# Patient Record
Sex: FEMALE | Race: WHITE | NOT HISPANIC OR LATINO | URBAN - METROPOLITAN AREA
[De-identification: names, ages, dates, MRNs, and addresses within clinical notes are randomized per-mention and may not be internally consistent; named-entity substitution may affect disease eponyms.]

---

## 2023-07-05 PROBLEM — N63.0 BREAST LUMP IN FEMALE: Status: ACTIVE | Noted: 2023-07-05

## 2023-07-05 PROBLEM — G47.33 OSA ON CPAP: Status: ACTIVE | Noted: 2023-07-05

## 2023-07-05 PROBLEM — N80.9 ENDOMETRIOSIS: Status: ACTIVE | Noted: 2023-07-05

## 2023-07-05 PROBLEM — N60.19 FIBROCYSTIC BREAST: Status: ACTIVE | Noted: 2023-07-05

## 2023-07-05 PROBLEM — R06.83 SNORING: Status: ACTIVE | Noted: 2023-07-05

## 2023-07-05 PROBLEM — N92.6 MISSED MENSES: Status: ACTIVE | Noted: 2023-07-05

## 2023-07-05 PROBLEM — R53.83 FATIGUE: Status: ACTIVE | Noted: 2023-07-05

## 2023-07-05 PROBLEM — G47.19 DAYTIME HYPERSOMNOLENCE: Status: ACTIVE | Noted: 2023-07-05

## 2023-07-05 PROBLEM — E66.01 MORBID OBESITY (MULTI): Status: ACTIVE | Noted: 2023-07-05

## 2023-07-05 PROBLEM — D64.9 ANEMIA: Status: ACTIVE | Noted: 2023-07-05

## 2023-07-05 PROBLEM — I10 ESSENTIAL HYPERTENSION: Status: ACTIVE | Noted: 2023-07-05

## 2023-07-05 PROBLEM — L83 ACANTHOSIS NIGRICANS: Status: ACTIVE | Noted: 2023-07-05

## 2023-07-05 PROBLEM — R35.0 FREQUENT URINATION: Status: ACTIVE | Noted: 2023-07-05

## 2023-07-05 RX ORDER — LOSARTAN POTASSIUM 100 MG/1
1 TABLET ORAL DAILY
COMMUNITY
Start: 2022-09-30 | End: 2023-07-06 | Stop reason: SDUPTHER

## 2023-07-06 ENCOUNTER — OFFICE VISIT (OUTPATIENT)
Dept: PRIMARY CARE | Facility: CLINIC | Age: 43
End: 2023-07-06
Payer: COMMERCIAL

## 2023-07-06 VITALS
DIASTOLIC BLOOD PRESSURE: 97 MMHG | OXYGEN SATURATION: 97 % | TEMPERATURE: 97.3 F | SYSTOLIC BLOOD PRESSURE: 173 MMHG | BODY MASS INDEX: 46.05 KG/M2 | HEART RATE: 74 BPM | WEIGHT: 293 LBS | RESPIRATION RATE: 16 BRPM

## 2023-07-06 DIAGNOSIS — E66.01 MORBID OBESITY (MULTI): ICD-10-CM

## 2023-07-06 DIAGNOSIS — G47.33 OSA ON CPAP: ICD-10-CM

## 2023-07-06 DIAGNOSIS — C50.911 MALIGNANT NEOPLASM OF RIGHT BREAST IN FEMALE, ESTROGEN RECEPTOR POSITIVE, UNSPECIFIED SITE OF BREAST (MULTI): ICD-10-CM

## 2023-07-06 DIAGNOSIS — I10 ESSENTIAL HYPERTENSION: Primary | ICD-10-CM

## 2023-07-06 DIAGNOSIS — Z17.0 MALIGNANT NEOPLASM OF RIGHT BREAST IN FEMALE, ESTROGEN RECEPTOR POSITIVE, UNSPECIFIED SITE OF BREAST (MULTI): ICD-10-CM

## 2023-07-06 PROBLEM — C80.1: Status: ACTIVE | Noted: 2023-04-25

## 2023-07-06 PROBLEM — C79.81: Status: ACTIVE | Noted: 2023-04-25

## 2023-07-06 PROCEDURE — 3080F DIAST BP >= 90 MM HG: CPT | Performed by: INTERNAL MEDICINE

## 2023-07-06 PROCEDURE — 3077F SYST BP >= 140 MM HG: CPT | Performed by: INTERNAL MEDICINE

## 2023-07-06 PROCEDURE — 99214 OFFICE O/P EST MOD 30 MIN: CPT | Performed by: INTERNAL MEDICINE

## 2023-07-06 PROCEDURE — 1036F TOBACCO NON-USER: CPT | Performed by: INTERNAL MEDICINE

## 2023-07-06 RX ORDER — LORATADINE 10 MG/1
10 TABLET ORAL DAILY
COMMUNITY
End: 2024-01-08 | Stop reason: WASHOUT

## 2023-07-06 RX ORDER — LOSARTAN POTASSIUM 100 MG/1
100 TABLET ORAL DAILY
Qty: 90 TABLET | Refills: 1 | Status: SHIPPED | OUTPATIENT
Start: 2023-07-06 | End: 2024-01-08 | Stop reason: SDUPTHER

## 2023-07-06 RX ORDER — LISINOPRIL 10 MG/1
10 TABLET ORAL DAILY
Qty: 90 TABLET | Refills: 1 | Status: CANCELLED | OUTPATIENT
Start: 2023-07-06

## 2023-07-06 RX ORDER — AMOXICILLIN AND CLAVULANATE POTASSIUM 875; 125 MG/1; MG/1
875 TABLET, FILM COATED ORAL 2 TIMES DAILY
COMMUNITY
Start: 2023-06-21 | End: 2024-01-08 | Stop reason: WASHOUT

## 2023-07-06 RX ORDER — CHLORTHALIDONE 25 MG/1
12.5 TABLET ORAL DAILY
Qty: 45 TABLET | Refills: 3 | Status: SHIPPED | OUTPATIENT
Start: 2023-07-06 | End: 2024-01-08 | Stop reason: SDUPTHER

## 2023-07-06 RX ORDER — LISINOPRIL 10 MG/1
10 TABLET ORAL DAILY
COMMUNITY
Start: 2022-09-29 | End: 2023-07-06 | Stop reason: ALTCHOICE

## 2023-07-06 RX ORDER — AMLODIPINE BESYLATE 5 MG/1
5 TABLET ORAL
COMMUNITY
End: 2023-07-06 | Stop reason: SDUPTHER

## 2023-07-06 RX ORDER — AMLODIPINE BESYLATE 5 MG/1
5 TABLET ORAL
Qty: 90 TABLET | Refills: 3 | Status: SHIPPED | OUTPATIENT
Start: 2023-07-06 | End: 2024-01-08 | Stop reason: SDUPTHER

## 2023-07-06 SDOH — ECONOMIC STABILITY: FOOD INSECURITY: WITHIN THE PAST 12 MONTHS, THE FOOD YOU BOUGHT JUST DIDN'T LAST AND YOU DIDN'T HAVE MONEY TO GET MORE.: NEVER TRUE

## 2023-07-06 SDOH — ECONOMIC STABILITY: FOOD INSECURITY: WITHIN THE PAST 12 MONTHS, YOU WORRIED THAT YOUR FOOD WOULD RUN OUT BEFORE YOU GOT MONEY TO BUY MORE.: NEVER TRUE

## 2023-07-06 ASSESSMENT — PATIENT HEALTH QUESTIONNAIRE - PHQ9
2. FEELING DOWN, DEPRESSED OR HOPELESS: NOT AT ALL
SUM OF ALL RESPONSES TO PHQ9 QUESTIONS 1 & 2: 0
1. LITTLE INTEREST OR PLEASURE IN DOING THINGS: NOT AT ALL

## 2023-07-06 ASSESSMENT — LIFESTYLE VARIABLES
HOW MANY STANDARD DRINKS CONTAINING ALCOHOL DO YOU HAVE ON A TYPICAL DAY: PATIENT DOES NOT DRINK
HOW OFTEN DO YOU HAVE SIX OR MORE DRINKS ON ONE OCCASION: NEVER
SKIP TO QUESTIONS 9-10: 1
AUDIT-C TOTAL SCORE: 0
HOW OFTEN DO YOU HAVE A DRINK CONTAINING ALCOHOL: NEVER

## 2023-07-06 ASSESSMENT — PAIN SCALES - GENERAL: PAINLEVEL: 0-NO PAIN

## 2023-07-06 NOTE — ASSESSMENT & PLAN NOTE
Continue follow up with oncology, radiation oncology, surgery has been completed. Patient will be started on an estrogen receptor antagonist after radiation is completed

## 2023-07-06 NOTE — ASSESSMENT & PLAN NOTE
Continue the losartan and amlodipine, will add low dose chlorthalidone, monitor potassium levels with this change. Will order labs, recheck BP in 2 weeks.

## 2023-07-06 NOTE — PROGRESS NOTES
Chief Complaint/HPI:    Hypertensive urgency: she still has elevated BP, she does take losartan daily, she has been taking amlodipine also since BP was not well controlled. The BP required rapid reduction prior to surgery.     breast cancer: patient is being treated for breast cancer at [facility], she has been undergoing chemotherapy, surgery was completed, breast implant was completed by plastic surgery. Radiation has been started now. Port has been removed,  she has not required any nausea meds yet. Patient has lost some weight, taste has improved again. Patient gets blood work frequently at [facility] she states  ROS otherwise negative aside from what was mentioned above in HPI.      Patient Active Problem List   Diagnosis    Endometriosis    Daytime hypersomnolence    Fibrocystic breast    Breast lump in female    Anemia    Acanthosis nigricans    Essential hypertension    Fatigue    Frequent urination    Missed menses    Snoring    CONSTANZA on CPAP    Morbid obesity (CMS/HCC)    Hypertension    Polycystic ovaries    Malignant neoplasm of right breast in female, estrogen receptor positive (CMS/HCC)    Malignant neoplasm metastatic to upper-outer quadrant of female breast with unknown primary site (CMS/HCC)         Past Medical History:   Diagnosis Date    Acanthosis nigricans 07/05/2023    Anemia 07/05/2023    Breast lump in female 07/05/2023    Daytime hypersomnolence 07/05/2023    Endometriosis 07/05/2023    Essential hypertension 07/05/2023    Fatigue 07/05/2023    Fibrocystic breast 07/05/2023    Frequent urination 07/05/2023    Missed menses 07/05/2023    Morbid obesity (CMS/HCC) 07/05/2023    CONSTANZA on CPAP 07/05/2023    Snoring 07/05/2023     Past Surgical History:   Procedure Laterality Date    OTHER SURGICAL HISTORY  08/04/2022    Cholecystectomy    OTHER SURGICAL HISTORY  08/04/2022    Tooth extraction     Social History     Social History Narrative    Not on file         ALLERGIES  Lisinopril      MEDICATIONS  Current  Outpatient Medications on File Prior to Visit   Medication Sig Dispense Refill    amoxicillin-pot clavulanate (Augmentin) 875-125 mg tablet Take 1 tablet (875 mg) by mouth 2 times a day.      [DISCONTINUED] losartan (Cozaar) 100 mg tablet Take 1 tablet (100 mg) by mouth once daily.      loratadine (Claritin) 10 mg tablet Take 1 tablet (10 mg) by mouth once daily.      [DISCONTINUED] amLODIPine (Norvasc) 5 mg tablet Take 1 tablet (5 mg) by mouth once daily.      [DISCONTINUED] lisinopril 10 mg tablet Take 1 tablet (10 mg) by mouth once daily.       No current facility-administered medications on file prior to visit.         PHYSICAL EXAM  BP (!) 173/97 (BP Location: Left arm, Patient Position: Sitting, BP Cuff Size: Adult) Comment: just had radiation  Pulse 74   Temp 36.3 °C (97.3 °F) (Temporal)   Resp 16   Wt 133 kg (294 lb)   SpO2 97%   BMI 46.05 kg/m²   Body mass index is 46.05 kg/m².  Constitutional   General appearance: Abnormal.  well developed, appears healthy, well nourished, morbidly obese,  she is a little anxious today. Alopecia has resolved.  Inspection of eyes: Sclera and conjunctiva were normal. wearing glasses.   Ears, Nose, Mouth, and Throat   Ears: Auricles: Normal.   Pulmonary   Respiratory assessment: No respiratory distress, normal respiratory rhythm and effort.    Auscultation of Lungs: Clear bilateral breath sounds.   Cardiovascular   Auscultation of heart: Apical pulse normal, heart rate and rhythm normal, normal S1 and S2, no murmurs and no pericardial rub.  Distant heart tones.  Exam for edema: No peripheral edema.   Lymphatic   Palpation of lymph nodes in neck: No cervical lymphadenopathy.   Neurologic   Cranial nerves: Nerves 2-12 were intact, no focal neuro defects.    Psychiatric   Orientation: Oriented to person, place, and time.    Mood and affect: Normal.     ASSESSMENT/PLAN  Problem List Items Addressed This Visit       Essential hypertension - Primary    Current Assessment &  Plan     Continue the losartan and amlodipine, will add low dose chlorthalidone, monitor potassium levels with this change. Will order labs, recheck BP in 2 weeks.          Relevant Medications    losartan (Cozaar) 100 mg tablet    chlorthalidone (Hygroton) 25 mg tablet    amLODIPine (Norvasc) 5 mg tablet    Other Relevant Orders    Comprehensive metabolic panel    Lipid panel    Hemoglobin A1c    Albumin, urine, random    CONSTANZA on CPAP    Current Assessment & Plan     Continue use of CPAP         Relevant Orders    Comprehensive metabolic panel    Lipid panel    Hemoglobin A1c    Albumin, urine, random    Morbid obesity (CMS/HCC)    Relevant Orders    Comprehensive metabolic panel    Lipid panel    Hemoglobin A1c    Albumin, urine, random    Malignant neoplasm of right breast in female, estrogen receptor positive (CMS/HCC)    Current Assessment & Plan     Continue follow up with oncology, radiation oncology, surgery has been completed. Patient will be started on an estrogen receptor antagonist after radiation is completed         Relevant Orders    Comprehensive metabolic panel    Lipid panel    Hemoglobin A1c    Albumin, urine, random   BP check in 2 weeks, chlorthalidone added  Follow up in 6 months, get labs completed      Dewayne Mccarthy MD

## 2023-07-20 ENCOUNTER — CLINICAL SUPPORT (OUTPATIENT)
Dept: PRIMARY CARE | Facility: CLINIC | Age: 43
End: 2023-07-20
Payer: COMMERCIAL

## 2023-07-20 VITALS
HEART RATE: 62 BPM | RESPIRATION RATE: 18 BRPM | DIASTOLIC BLOOD PRESSURE: 73 MMHG | SYSTOLIC BLOOD PRESSURE: 104 MMHG | OXYGEN SATURATION: 99 %

## 2023-07-20 DIAGNOSIS — I10 HYPERTENSION, UNSPECIFIED TYPE: ICD-10-CM

## 2023-07-20 PROCEDURE — 99211 OFF/OP EST MAY X REQ PHY/QHP: CPT | Performed by: INTERNAL MEDICINE

## 2023-07-20 PROCEDURE — 93790 AMBL BP MNTR W/SW I&R: CPT | Performed by: INTERNAL MEDICINE

## 2024-01-08 ENCOUNTER — OFFICE VISIT (OUTPATIENT)
Dept: PRIMARY CARE | Facility: CLINIC | Age: 44
End: 2024-01-08
Payer: COMMERCIAL

## 2024-01-08 ENCOUNTER — LAB (OUTPATIENT)
Dept: LAB | Facility: LAB | Age: 44
End: 2024-01-08
Payer: COMMERCIAL

## 2024-01-08 VITALS
HEART RATE: 92 BPM | WEIGHT: 293 LBS | RESPIRATION RATE: 16 BRPM | OXYGEN SATURATION: 96 % | TEMPERATURE: 97.5 F | DIASTOLIC BLOOD PRESSURE: 65 MMHG | HEIGHT: 67 IN | SYSTOLIC BLOOD PRESSURE: 131 MMHG | BODY MASS INDEX: 45.99 KG/M2

## 2024-01-08 DIAGNOSIS — C50.911 MALIGNANT NEOPLASM OF RIGHT BREAST IN FEMALE, ESTROGEN RECEPTOR POSITIVE, UNSPECIFIED SITE OF BREAST (MULTI): Primary | ICD-10-CM

## 2024-01-08 DIAGNOSIS — E66.01 MORBID OBESITY (MULTI): ICD-10-CM

## 2024-01-08 DIAGNOSIS — C79.81: ICD-10-CM

## 2024-01-08 DIAGNOSIS — Z17.0 MALIGNANT NEOPLASM OF RIGHT BREAST IN FEMALE, ESTROGEN RECEPTOR POSITIVE, UNSPECIFIED SITE OF BREAST (MULTI): ICD-10-CM

## 2024-01-08 DIAGNOSIS — I10 ESSENTIAL HYPERTENSION: ICD-10-CM

## 2024-01-08 DIAGNOSIS — C50.911 MALIGNANT NEOPLASM OF RIGHT BREAST IN FEMALE, ESTROGEN RECEPTOR POSITIVE, UNSPECIFIED SITE OF BREAST (MULTI): ICD-10-CM

## 2024-01-08 DIAGNOSIS — G47.33 OSA ON CPAP: ICD-10-CM

## 2024-01-08 DIAGNOSIS — C80.1: ICD-10-CM

## 2024-01-08 DIAGNOSIS — Z17.0 MALIGNANT NEOPLASM OF RIGHT BREAST IN FEMALE, ESTROGEN RECEPTOR POSITIVE, UNSPECIFIED SITE OF BREAST (MULTI): Primary | ICD-10-CM

## 2024-01-08 DIAGNOSIS — T85.79XA: ICD-10-CM

## 2024-01-08 PROBLEM — L03.313 CELLULITIS OF CHEST WALL: Status: ACTIVE | Noted: 2023-10-11

## 2024-01-08 PROBLEM — Z98.82 H/O BREAST IMPLANT: Status: ACTIVE | Noted: 2023-10-11

## 2024-01-08 LAB
ALBUMIN SERPL BCP-MCNC: 4 G/DL (ref 3.4–5)
ALP SERPL-CCNC: 97 U/L (ref 33–110)
ALT SERPL W P-5'-P-CCNC: 28 U/L (ref 7–45)
ANION GAP SERPL CALC-SCNC: 12 MMOL/L (ref 10–20)
AST SERPL W P-5'-P-CCNC: 26 U/L (ref 9–39)
BILIRUB SERPL-MCNC: 0.5 MG/DL (ref 0–1.2)
BUN SERPL-MCNC: 14 MG/DL (ref 6–23)
CALCIUM SERPL-MCNC: 8.9 MG/DL (ref 8.6–10.3)
CHLORIDE SERPL-SCNC: 102 MMOL/L (ref 98–107)
CHOLEST SERPL-MCNC: 177 MG/DL (ref 0–199)
CHOLESTEROL/HDL RATIO: 4.7
CO2 SERPL-SCNC: 27 MMOL/L (ref 21–32)
CREAT SERPL-MCNC: 0.68 MG/DL (ref 0.5–1.05)
CREAT UR-MCNC: 110.4 MG/DL (ref 20–320)
EGFRCR SERPLBLD CKD-EPI 2021: >90 ML/MIN/1.73M*2
EST. AVERAGE GLUCOSE BLD GHB EST-MCNC: 120 MG/DL
GLUCOSE SERPL-MCNC: 91 MG/DL (ref 74–99)
HBA1C MFR BLD: 5.8 %
HDLC SERPL-MCNC: 37.3 MG/DL
LDLC SERPL CALC-MCNC: 100 MG/DL
MICROALBUMIN UR-MCNC: 35.3 MG/L
MICROALBUMIN/CREAT UR: 32 UG/MG CREAT
NON HDL CHOLESTEROL: 140 MG/DL (ref 0–149)
POTASSIUM SERPL-SCNC: 3.7 MMOL/L (ref 3.5–5.3)
PROT SERPL-MCNC: 6.8 G/DL (ref 6.4–8.2)
SODIUM SERPL-SCNC: 137 MMOL/L (ref 136–145)
TRIGL SERPL-MCNC: 197 MG/DL (ref 0–149)
VLDL: 39 MG/DL (ref 0–40)

## 2024-01-08 PROCEDURE — 99214 OFFICE O/P EST MOD 30 MIN: CPT | Performed by: INTERNAL MEDICINE

## 2024-01-08 PROCEDURE — 3078F DIAST BP <80 MM HG: CPT | Performed by: INTERNAL MEDICINE

## 2024-01-08 PROCEDURE — 82570 ASSAY OF URINE CREATININE: CPT

## 2024-01-08 PROCEDURE — 3075F SYST BP GE 130 - 139MM HG: CPT | Performed by: INTERNAL MEDICINE

## 2024-01-08 PROCEDURE — 80061 LIPID PANEL: CPT

## 2024-01-08 PROCEDURE — 1036F TOBACCO NON-USER: CPT | Performed by: INTERNAL MEDICINE

## 2024-01-08 PROCEDURE — 83036 HEMOGLOBIN GLYCOSYLATED A1C: CPT

## 2024-01-08 PROCEDURE — 36415 COLL VENOUS BLD VENIPUNCTURE: CPT

## 2024-01-08 PROCEDURE — 80053 COMPREHEN METABOLIC PANEL: CPT

## 2024-01-08 PROCEDURE — 82043 UR ALBUMIN QUANTITATIVE: CPT

## 2024-01-08 RX ORDER — LOSARTAN POTASSIUM 100 MG/1
100 TABLET ORAL DAILY
Qty: 90 TABLET | Refills: 1 | Status: SHIPPED | OUTPATIENT
Start: 2024-01-08 | End: 2024-05-20 | Stop reason: SDUPTHER

## 2024-01-08 RX ORDER — AMLODIPINE BESYLATE 5 MG/1
5 TABLET ORAL
Qty: 90 TABLET | Refills: 1 | Status: SHIPPED | OUTPATIENT
Start: 2024-01-08 | End: 2024-05-20 | Stop reason: SDUPTHER

## 2024-01-08 RX ORDER — CHLORTHALIDONE 25 MG/1
12.5 TABLET ORAL DAILY
Qty: 45 TABLET | Refills: 1 | Status: SHIPPED | OUTPATIENT
Start: 2024-01-08 | End: 2024-05-20 | Stop reason: SDUPTHER

## 2024-01-08 ASSESSMENT — LIFESTYLE VARIABLES
SKIP TO QUESTIONS 9-10: 1
HOW OFTEN DO YOU HAVE A DRINK CONTAINING ALCOHOL: NEVER
HOW MANY STANDARD DRINKS CONTAINING ALCOHOL DO YOU HAVE ON A TYPICAL DAY: PATIENT DOES NOT DRINK
AUDIT-C TOTAL SCORE: 0
HOW OFTEN DO YOU HAVE SIX OR MORE DRINKS ON ONE OCCASION: NEVER

## 2024-01-08 ASSESSMENT — PATIENT HEALTH QUESTIONNAIRE - PHQ9
1. LITTLE INTEREST OR PLEASURE IN DOING THINGS: NOT AT ALL
2. FEELING DOWN, DEPRESSED OR HOPELESS: NOT AT ALL
SUM OF ALL RESPONSES TO PHQ9 QUESTIONS 1 & 2: 0

## 2024-01-08 NOTE — PROGRESS NOTES
Chief Complaint/HPI:    Hypertension: patient now takes amlodipine, chlorthalidone, losartan. BP control is improved today, patient states that she is due for blood work, she has not yet completed the labs ordered here.      breast cancer: patient is being treated for breast cancer at [facility], she had chemotherapy, surgery was completed, breast implant was completed by plastic surgery, the patient had a reaction to breast implant, the implant was removed, patient states that she had infection at the implant site. She was ordered oral antibiotics post removal by ID service. Radiation has been completed. Port has been removed,  she has not required any nausea meds yet. Patient has lost some weight, taste has improved again. Patient gets blood work frequently at [facility] she states, she is due for blood work here. Patient states that she has refused further plastic surgery for breast reconstruction.     ROS otherwise negative aside from what was mentioned above in HPI.      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 (CMS/HCC)         Past Medical History:   Diagnosis Date    Acanthosis nigricans 07/05/2023    Anemia 07/05/2023    Breast lump in female 07/05/2023    Daytime hypersomnolence 07/05/2023    Endometriosis 07/05/2023    Essential hypertension 07/05/2023    Fatigue 07/05/2023    Fibrocystic breast 07/05/2023    Frequent  "urination 07/05/2023    Missed menses 07/05/2023    Morbid obesity (CMS/HCC) 07/05/2023    CONSTANZA on CPAP 07/05/2023    Snoring 07/05/2023     Past Surgical History:   Procedure Laterality Date    BI US GUIDED BREAST LOCALIZATION AND BIOPSY RIGHT Right 4/24/2023    BI US GUIDED BREAST LOCALIZATION AND BIOPSY RIGHT 4/24/2023    IR CVC PORT REMOVAL  5/24/2023    IR CVC PORT REMOVAL 5/24/2023    OTHER SURGICAL HISTORY  08/04/2022    Cholecystectomy    OTHER SURGICAL HISTORY  08/04/2022    Tooth extraction     Social History     Social History Narrative    Not on file         ALLERGIES  Lisinopril      MEDICATIONS  Current Outpatient Medications on File Prior to Visit   Medication Sig Dispense Refill    [DISCONTINUED] amLODIPine (Norvasc) 5 mg tablet Take 1 tablet (5 mg) by mouth once daily. 90 tablet 3    [DISCONTINUED] chlorthalidone (Hygroton) 25 mg tablet Take 0.5 tablets (12.5 mg) by mouth once daily. 45 tablet 3    [DISCONTINUED] losartan (Cozaar) 100 mg tablet Take 1 tablet (100 mg) by mouth once daily. 90 tablet 1    [DISCONTINUED] amoxicillin-pot clavulanate (Augmentin) 875-125 mg tablet Take 1 tablet (875 mg) by mouth 2 times a day.      [DISCONTINUED] loratadine (Claritin) 10 mg tablet Take 1 tablet (10 mg) by mouth once daily.       No current facility-administered medications on file prior to visit.         PHYSICAL EXAM  /65 (BP Location: Left arm, Patient Position: Sitting, BP Cuff Size: Large adult)   Pulse 92   Temp 36.4 °C (97.5 °F)   Resp 16   Ht 1.702 m (5' 7\")   Wt 137 kg (302 lb)   SpO2 96%   BMI 47.30 kg/m²   Body mass index is 47.3 kg/m².  Constitutional   General appearance:  well developed, appears healthy, well nourished, morbidly obese,  she is stable today. No alopecia noted now.  Inspection of eyes: Sclera and conjunctiva were normal. wearing glasses.   Ears, Nose, Mouth, and Throat   Ears: Auricles: Normal. No thyromegaly or neck masses.  Pulmonary   Respiratory assessment: No " respiratory distress, normal respiratory rhythm and effort.    Auscultation of Lungs: Clear bilateral breath sounds.   Cardiovascular   Auscultation of heart: Apical pulse normal, heart rate and rhythm normal, normal S1 and S2, no murmurs and no pericardial rub.  Distant heart tones. No carotid bruits were noted.   Exam for edema: No peripheral edema.   Lymphatic   Palpation of lymph nodes in neck: No cervical lymphadenopathy.   Neurologic   Cranial nerves: Nerves 2-12 were intact, no focal neuro defects.    Psychiatric   Orientation: Oriented to person, place, and time.    Mood and affect: Normal.   ASSESSMENT/PLAN  Problem List Items Addressed This Visit       Essential hypertension    Current Assessment & Plan     Continue current BP meds, BP is stable today, no med changes needed at present time         Relevant Medications    amLODIPine (Norvasc) 5 mg tablet    chlorthalidone (Hygroton) 25 mg tablet    losartan (Cozaar) 100 mg tablet    Morbid obesity (CMS/HCC)    Current Assessment & Plan     Patient's weight has not changed.  Encourage weight loss now. 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 May follow up in 4-6 months    Dewayne Mccarthy MD

## 2024-01-08 NOTE — ASSESSMENT & PLAN NOTE
Breast implant was removed and patient was treated with antibiotic therapy at [facility], patient does not want further reconstructive breast surgery at this time

## 2024-01-08 NOTE — ASSESSMENT & PLAN NOTE
S/p mastectomy, chemotherapy and radiation therapy, breast implant was removed, it appears to have been infected.  Continue follow up with oncology at [facility]

## 2024-01-08 NOTE — ASSESSMENT & PLAN NOTE
Patient's weight has not changed.  Encourage weight loss now. Patient admits that she has not thought about it recently

## 2024-05-20 ENCOUNTER — OFFICE VISIT (OUTPATIENT)
Dept: PRIMARY CARE | Facility: CLINIC | Age: 44
End: 2024-05-20
Payer: COMMERCIAL

## 2024-05-20 VITALS
HEART RATE: 98 BPM | SYSTOLIC BLOOD PRESSURE: 158 MMHG | DIASTOLIC BLOOD PRESSURE: 82 MMHG | RESPIRATION RATE: 16 BRPM | TEMPERATURE: 97.9 F | HEIGHT: 67 IN | OXYGEN SATURATION: 98 % | WEIGHT: 293 LBS | BODY MASS INDEX: 45.99 KG/M2

## 2024-05-20 DIAGNOSIS — I10 ESSENTIAL HYPERTENSION: ICD-10-CM

## 2024-05-20 DIAGNOSIS — R73.9 HYPERGLYCEMIA: ICD-10-CM

## 2024-05-20 DIAGNOSIS — E78.1 HYPERTRIGLYCERIDEMIA: ICD-10-CM

## 2024-05-20 DIAGNOSIS — G47.33 OSA ON CPAP: ICD-10-CM

## 2024-05-20 DIAGNOSIS — E66.01 MORBID OBESITY (MULTI): Primary | ICD-10-CM

## 2024-05-20 DIAGNOSIS — Z17.0 MALIGNANT NEOPLASM OF RIGHT BREAST IN FEMALE, ESTROGEN RECEPTOR POSITIVE, UNSPECIFIED SITE OF BREAST (MULTI): ICD-10-CM

## 2024-05-20 DIAGNOSIS — C50.911 MALIGNANT NEOPLASM OF RIGHT BREAST IN FEMALE, ESTROGEN RECEPTOR POSITIVE, UNSPECIFIED SITE OF BREAST (MULTI): ICD-10-CM

## 2024-05-20 DIAGNOSIS — Z11.59 ENCOUNTER FOR HEPATITIS C SCREENING TEST FOR LOW RISK PATIENT: ICD-10-CM

## 2024-05-20 DIAGNOSIS — Z11.4 SCREENING FOR HUMAN IMMUNODEFICIENCY VIRUS WITHOUT PRESENCE OF RISK FACTORS: ICD-10-CM

## 2024-05-20 PROCEDURE — 3077F SYST BP >= 140 MM HG: CPT | Performed by: INTERNAL MEDICINE

## 2024-05-20 PROCEDURE — 1036F TOBACCO NON-USER: CPT | Performed by: INTERNAL MEDICINE

## 2024-05-20 PROCEDURE — 99214 OFFICE O/P EST MOD 30 MIN: CPT | Performed by: INTERNAL MEDICINE

## 2024-05-20 PROCEDURE — 3079F DIAST BP 80-89 MM HG: CPT | Performed by: INTERNAL MEDICINE

## 2024-05-20 RX ORDER — CHLORTHALIDONE 25 MG/1
25 TABLET ORAL DAILY
Qty: 90 TABLET | Refills: 3 | Status: SHIPPED | OUTPATIENT
Start: 2024-05-20 | End: 2025-05-20

## 2024-05-20 RX ORDER — ANASTROZOLE 1 MG/1
1 TABLET ORAL
COMMUNITY
Start: 2024-03-04 | End: 2024-08-31

## 2024-05-20 RX ORDER — LOSARTAN POTASSIUM 100 MG/1
100 TABLET ORAL DAILY
Qty: 90 TABLET | Refills: 3 | Status: SHIPPED | OUTPATIENT
Start: 2024-05-20

## 2024-05-20 RX ORDER — AMLODIPINE BESYLATE 5 MG/1
5 TABLET ORAL
Qty: 90 TABLET | Refills: 3 | Status: SHIPPED | OUTPATIENT
Start: 2024-05-20 | End: 2025-05-20

## 2024-05-20 SDOH — ECONOMIC STABILITY: FOOD INSECURITY: WITHIN THE PAST 12 MONTHS, THE FOOD YOU BOUGHT JUST DIDN'T LAST AND YOU DIDN'T HAVE MONEY TO GET MORE.: NEVER TRUE

## 2024-05-20 SDOH — ECONOMIC STABILITY: FOOD INSECURITY: WITHIN THE PAST 12 MONTHS, YOU WORRIED THAT YOUR FOOD WOULD RUN OUT BEFORE YOU GOT MONEY TO BUY MORE.: NEVER TRUE

## 2024-05-20 ASSESSMENT — LIFESTYLE VARIABLES
HOW OFTEN DO YOU HAVE A DRINK CONTAINING ALCOHOL: NEVER
AUDIT-C TOTAL SCORE: 0
HOW OFTEN DO YOU HAVE SIX OR MORE DRINKS ON ONE OCCASION: NEVER
HOW MANY STANDARD DRINKS CONTAINING ALCOHOL DO YOU HAVE ON A TYPICAL DAY: PATIENT DOES NOT DRINK
SKIP TO QUESTIONS 9-10: 1

## 2024-05-20 ASSESSMENT — PATIENT HEALTH QUESTIONNAIRE - PHQ9
2. FEELING DOWN, DEPRESSED OR HOPELESS: NOT AT ALL
1. LITTLE INTEREST OR PLEASURE IN DOING THINGS: NOT AT ALL
SUM OF ALL RESPONSES TO PHQ9 QUESTIONS 1 & 2: 0

## 2024-05-20 NOTE — PROGRESS NOTES
Chief Complaint/HPI:    Follow up:    Hypertension: patient now takes amlodipine, chlorthalidone, losartan. BP control is improved today, patient states that she is due for blood work, she has not yet completed the labs ordered here.      breast cancer: patient is being treated for breast cancer at [facility], she had chemotherapy, surgery was completed, breast implant was completed by plastic surgery, the patient had a reaction to breast implant, the implant was removed, patient states that she had infection at the implant site. Patient developed fluid accumulation at site of prior infection (seroma) . The fluid was removed but has returned. Patient will have chemical infusion/ sclerosis to induce scarring, patient states  Radiation has been completed. Port has been removed.  Patient has not lost weight, taste has improved again. Patient gets blood work  at [facility] she states, she is due for blood work here. Patient states that she has refused further plastic surgery for breast reconstruction. Patient receives Zoladex and anastrozole now.       ROS otherwise negative aside from what was mentioned above in HPI.      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 (CMS-HCC)    Hyperglycemia         Past Medical History:   Diagnosis Date    Acanthosis nigricans 07/05/2023    Anemia 07/05/2023    Breast lump in female 07/05/2023    Daytime  "hypersomnolence 07/05/2023    Endometriosis 07/05/2023    Essential hypertension 07/05/2023    Fatigue 07/05/2023    Fibrocystic breast 07/05/2023    Frequent urination 07/05/2023    Missed menses 07/05/2023    Morbid obesity (Multi) 07/05/2023    CONSTANZA on CPAP 07/05/2023    Snoring 07/05/2023     Past Surgical History:   Procedure Laterality Date    BI US GUIDED BREAST LOCALIZATION AND BIOPSY RIGHT Right 4/24/2023    BI US GUIDED BREAST LOCALIZATION AND BIOPSY RIGHT 4/24/2023    IR CVC PORT REMOVAL  5/24/2023    IR CVC PORT REMOVAL 5/24/2023    OTHER SURGICAL HISTORY  08/04/2022    Cholecystectomy    OTHER SURGICAL HISTORY  08/04/2022    Tooth extraction     Social History     Social History Narrative    Not on file         ALLERGIES  Lisinopril      MEDICATIONS  Current Outpatient Medications on File Prior to Visit   Medication Sig Dispense Refill    anastrozole (Arimidex) 1 mg tablet Take 1 tablet (1 mg total) by mouth once daily.      goserelin (Zoladex) 3.6 mg injection 1 each (3.6 mg) by abdominal subcutaneous route every 30 (thirty) days.      [DISCONTINUED] amLODIPine (Norvasc) 5 mg tablet Take 1 tablet (5 mg) by mouth once daily. 90 tablet 1    [DISCONTINUED] chlorthalidone (Hygroton) 25 mg tablet Take 0.5 tablets (12.5 mg) by mouth once daily. 45 tablet 1    [DISCONTINUED] losartan (Cozaar) 100 mg tablet Take 1 tablet (100 mg) by mouth once daily. 90 tablet 1     No current facility-administered medications on file prior to visit.         PHYSICAL EXAM  /82 (BP Location: Left arm, Patient Position: Sitting, BP Cuff Size: Adult)   Pulse 98   Temp 36.6 °C (97.9 °F)   Resp 16   Ht 1.702 m (5' 7\")   Wt 139 kg (306 lb)   SpO2 98%   BMI 47.93 kg/m²   Body mass index is 47.93 kg/m².  Gen: Alert, NAD, patient is morbidly obese  HEENT:  wears glasses, EOMI, conjunctiva and sclera normal in appearance, no thyromegaly or neck masses, neck is large  Respiratory:  Lungs CTAB  Cardiovascular:  Heart RRR. No " M/R/G, no peripheral edema, no carotid bruits noted  Abdomen: marked obesity noted  Neuro:  Gross motor and sensory intact  Skin:  No suspicious lesions present on exposed skin    ASSESSMENT/PLAN  Problem List Items Addressed This Visit       Essential hypertension    Current Assessment & Plan     BP is slightly elevated , patient takes multiple meds, encourage weight loss         Relevant Medications    amLODIPine (Norvasc) 5 mg tablet    chlorthalidone (Hygroton) 25 mg tablet    losartan (Cozaar) 100 mg tablet    Other Relevant Orders    Albumin , Urine Random    CBC and Auto Differential    Comprehensive Metabolic Panel    CONSTANZA on CPAP    Current Assessment & Plan     Patient uses CPAP         Morbid obesity (Multi) - Primary    Current Assessment & Plan     Encourage weight loss         Relevant Orders    CBC and Auto Differential    Comprehensive Metabolic Panel    Malignant neoplasm of right breast in female, estrogen receptor positive (Multi)    Current Assessment & Plan     Patient has a seroma, she will under treatment/ sclerosis of the seroma at [facility]         Relevant Orders    CBC and Auto Differential    Comprehensive Metabolic Panel    Hyperglycemia    Relevant Orders    CBC and Auto Differential    Comprehensive Metabolic Panel    Hemoglobin A1C     Other Visit Diagnoses       Hypertriglyceridemia        Relevant Orders    CBC and Auto Differential    Comprehensive Metabolic Panel    Hemoglobin A1C    Lipid Panel    Encounter for hepatitis C screening test for low risk patient        Relevant Orders    Hepatitis C antibody    Screening for human immunodeficiency virus without presence of risk factors        Relevant Orders    HIV 1/2 Antigen/Antibody Screen with Reflex to Confirmation          Check labs as ordered, follow up with plastic surgery, follow up here in 6 months, encourage weight loss    Dewayne Mccarthy MD

## 2024-07-10 ENCOUNTER — LAB (OUTPATIENT)
Dept: LAB | Facility: LAB | Age: 44
End: 2024-07-10
Payer: COMMERCIAL

## 2024-07-10 DIAGNOSIS — E66.01 MORBID OBESITY (MULTI): ICD-10-CM

## 2024-07-10 DIAGNOSIS — E78.1 HYPERTRIGLYCERIDEMIA: ICD-10-CM

## 2024-07-10 DIAGNOSIS — Z11.59 ENCOUNTER FOR HEPATITIS C SCREENING TEST FOR LOW RISK PATIENT: ICD-10-CM

## 2024-07-10 DIAGNOSIS — C50.911 MALIGNANT NEOPLASM OF RIGHT BREAST IN FEMALE, ESTROGEN RECEPTOR POSITIVE, UNSPECIFIED SITE OF BREAST (MULTI): ICD-10-CM

## 2024-07-10 DIAGNOSIS — Z17.0 MALIGNANT NEOPLASM OF RIGHT BREAST IN FEMALE, ESTROGEN RECEPTOR POSITIVE, UNSPECIFIED SITE OF BREAST (MULTI): ICD-10-CM

## 2024-07-10 DIAGNOSIS — Z11.4 SCREENING FOR HUMAN IMMUNODEFICIENCY VIRUS WITHOUT PRESENCE OF RISK FACTORS: ICD-10-CM

## 2024-07-10 DIAGNOSIS — R73.9 HYPERGLYCEMIA: ICD-10-CM

## 2024-07-10 DIAGNOSIS — I10 ESSENTIAL HYPERTENSION: ICD-10-CM

## 2024-07-10 LAB
ALBUMIN SERPL BCP-MCNC: 4 G/DL (ref 3.4–5)
ALP SERPL-CCNC: 118 U/L (ref 33–110)
ALT SERPL W P-5'-P-CCNC: 31 U/L (ref 7–45)
ANION GAP SERPL CALC-SCNC: 10 MMOL/L (ref 10–20)
AST SERPL W P-5'-P-CCNC: 25 U/L (ref 9–39)
BASOPHILS # BLD AUTO: 0.03 X10*3/UL (ref 0–0.1)
BASOPHILS NFR BLD AUTO: 0.4 %
BILIRUB SERPL-MCNC: 0.9 MG/DL (ref 0–1.2)
BUN SERPL-MCNC: 15 MG/DL (ref 6–23)
CALCIUM SERPL-MCNC: 8.8 MG/DL (ref 8.6–10.3)
CHLORIDE SERPL-SCNC: 101 MMOL/L (ref 98–107)
CHOLEST SERPL-MCNC: 165 MG/DL (ref 0–199)
CHOLESTEROL/HDL RATIO: 4.4
CO2 SERPL-SCNC: 29 MMOL/L (ref 21–32)
CREAT SERPL-MCNC: 0.69 MG/DL (ref 0.5–1.05)
CREAT UR-MCNC: 107.5 MG/DL (ref 20–320)
EGFRCR SERPLBLD CKD-EPI 2021: >90 ML/MIN/1.73M*2
EOSINOPHIL # BLD AUTO: 0.19 X10*3/UL (ref 0–0.7)
EOSINOPHIL NFR BLD AUTO: 2.8 %
ERYTHROCYTE [DISTWIDTH] IN BLOOD BY AUTOMATED COUNT: 17.2 % (ref 11.5–14.5)
EST. AVERAGE GLUCOSE BLD GHB EST-MCNC: 126 MG/DL
GLUCOSE SERPL-MCNC: 117 MG/DL (ref 74–99)
HBA1C MFR BLD: 6 %
HCT VFR BLD AUTO: 40 % (ref 36–46)
HDLC SERPL-MCNC: 37.6 MG/DL
HGB BLD-MCNC: 12.7 G/DL (ref 12–16)
IMM GRANULOCYTES # BLD AUTO: 0.03 X10*3/UL (ref 0–0.7)
IMM GRANULOCYTES NFR BLD AUTO: 0.4 % (ref 0–0.9)
LDLC SERPL CALC-MCNC: 96 MG/DL
LYMPHOCYTES # BLD AUTO: 1.73 X10*3/UL (ref 1.2–4.8)
LYMPHOCYTES NFR BLD AUTO: 25.3 %
MCH RBC QN AUTO: 23.7 PG (ref 26–34)
MCHC RBC AUTO-ENTMCNC: 31.8 G/DL (ref 32–36)
MCV RBC AUTO: 75 FL (ref 80–100)
MICROALBUMIN UR-MCNC: 23.3 MG/L
MICROALBUMIN/CREAT UR: 21.7 UG/MG CREAT
MONOCYTES # BLD AUTO: 0.43 X10*3/UL (ref 0.1–1)
MONOCYTES NFR BLD AUTO: 6.3 %
NEUTROPHILS # BLD AUTO: 4.43 X10*3/UL (ref 1.2–7.7)
NEUTROPHILS NFR BLD AUTO: 64.8 %
NON HDL CHOLESTEROL: 127 MG/DL (ref 0–149)
NRBC BLD-RTO: 0 /100 WBCS (ref 0–0)
PLATELET # BLD AUTO: 347 X10*3/UL (ref 150–450)
POTASSIUM SERPL-SCNC: 3.2 MMOL/L (ref 3.5–5.3)
PROT SERPL-MCNC: 7 G/DL (ref 6.4–8.2)
RBC # BLD AUTO: 5.36 X10*6/UL (ref 4–5.2)
SODIUM SERPL-SCNC: 137 MMOL/L (ref 136–145)
TRIGL SERPL-MCNC: 157 MG/DL (ref 0–149)
VLDL: 31 MG/DL (ref 0–40)
WBC # BLD AUTO: 6.8 X10*3/UL (ref 4.4–11.3)

## 2024-07-10 PROCEDURE — 83036 HEMOGLOBIN GLYCOSYLATED A1C: CPT

## 2024-07-10 PROCEDURE — 80053 COMPREHEN METABOLIC PANEL: CPT

## 2024-07-10 PROCEDURE — 86803 HEPATITIS C AB TEST: CPT

## 2024-07-10 PROCEDURE — 82043 UR ALBUMIN QUANTITATIVE: CPT

## 2024-07-10 PROCEDURE — 80061 LIPID PANEL: CPT

## 2024-07-10 PROCEDURE — 87389 HIV-1 AG W/HIV-1&-2 AB AG IA: CPT

## 2024-07-10 PROCEDURE — 36415 COLL VENOUS BLD VENIPUNCTURE: CPT

## 2024-07-10 PROCEDURE — 82570 ASSAY OF URINE CREATININE: CPT

## 2024-07-10 PROCEDURE — 85025 COMPLETE CBC W/AUTO DIFF WBC: CPT

## 2024-07-11 LAB
HCV AB SER QL: NONREACTIVE
HIV 1+2 AB+HIV1 P24 AG SERPL QL IA: NONREACTIVE

## 2024-11-27 ENCOUNTER — APPOINTMENT (OUTPATIENT)
Dept: PRIMARY CARE | Facility: CLINIC | Age: 44
End: 2024-11-27
Payer: COMMERCIAL

## 2024-11-27 VITALS
SYSTOLIC BLOOD PRESSURE: 123 MMHG | HEIGHT: 67 IN | HEART RATE: 65 BPM | OXYGEN SATURATION: 97 % | TEMPERATURE: 97.6 F | RESPIRATION RATE: 16 BRPM | WEIGHT: 293 LBS | BODY MASS INDEX: 45.99 KG/M2 | DIASTOLIC BLOOD PRESSURE: 80 MMHG

## 2024-11-27 DIAGNOSIS — I10 ESSENTIAL HYPERTENSION: Primary | ICD-10-CM

## 2024-11-27 DIAGNOSIS — R73.03 PREDIABETES: ICD-10-CM

## 2024-11-27 DIAGNOSIS — E66.01 MORBID OBESITY (MULTI): ICD-10-CM

## 2024-11-27 DIAGNOSIS — T85.79XA: ICD-10-CM

## 2024-11-27 DIAGNOSIS — G47.33 OSA ON CPAP: ICD-10-CM

## 2024-11-27 DIAGNOSIS — M81.0 POSTMENOPAUSAL BONE LOSS: ICD-10-CM

## 2024-11-27 DIAGNOSIS — Z17.0 MALIGNANT NEOPLASM OF RIGHT BREAST IN FEMALE, ESTROGEN RECEPTOR POSITIVE, UNSPECIFIED SITE OF BREAST: ICD-10-CM

## 2024-11-27 DIAGNOSIS — C50.911 MALIGNANT NEOPLASM OF RIGHT BREAST IN FEMALE, ESTROGEN RECEPTOR POSITIVE, UNSPECIFIED SITE OF BREAST: ICD-10-CM

## 2024-11-27 PROCEDURE — 1036F TOBACCO NON-USER: CPT | Performed by: INTERNAL MEDICINE

## 2024-11-27 PROCEDURE — 99214 OFFICE O/P EST MOD 30 MIN: CPT | Performed by: INTERNAL MEDICINE

## 2024-11-27 PROCEDURE — 3079F DIAST BP 80-89 MM HG: CPT | Performed by: INTERNAL MEDICINE

## 2024-11-27 PROCEDURE — 3008F BODY MASS INDEX DOCD: CPT | Performed by: INTERNAL MEDICINE

## 2024-11-27 PROCEDURE — 3074F SYST BP LT 130 MM HG: CPT | Performed by: INTERNAL MEDICINE

## 2024-11-27 RX ORDER — SEMAGLUTIDE 1.34 MG/ML
0.25 INJECTION, SOLUTION SUBCUTANEOUS WEEKLY
Qty: 1.5 ML | Refills: 1 | Status: SHIPPED | OUTPATIENT
Start: 2024-11-27

## 2024-11-27 SDOH — ECONOMIC STABILITY: FOOD INSECURITY: WITHIN THE PAST 12 MONTHS, THE FOOD YOU BOUGHT JUST DIDN'T LAST AND YOU DIDN'T HAVE MONEY TO GET MORE.: NEVER TRUE

## 2024-11-27 SDOH — ECONOMIC STABILITY: FOOD INSECURITY: WITHIN THE PAST 12 MONTHS, YOU WORRIED THAT YOUR FOOD WOULD RUN OUT BEFORE YOU GOT MONEY TO BUY MORE.: NEVER TRUE

## 2024-11-27 ASSESSMENT — LIFESTYLE VARIABLES
HOW MANY STANDARD DRINKS CONTAINING ALCOHOL DO YOU HAVE ON A TYPICAL DAY: PATIENT DOES NOT DRINK
HOW OFTEN DO YOU HAVE SIX OR MORE DRINKS ON ONE OCCASION: NEVER
SKIP TO QUESTIONS 9-10: 1
HOW OFTEN DO YOU HAVE A DRINK CONTAINING ALCOHOL: NEVER
AUDIT-C TOTAL SCORE: 0

## 2024-11-27 NOTE — PROGRESS NOTES
Chief Complaint/HPI:    Hypertension: patient now takes amlodipine, losartan. BP control is improved today, patient states that she is due for blood work, she has not yet completed the labs ordered here. Patient states that chlorthalidone prescription was not renewed, she has not been taking it for 2 months. BP has been well controlled        breast cancer: patient is being treated for breast cancer at [facility], she had chemotherapy, surgery was completed, breast implant was completed by plastic surgery, the patient had a reaction to breast implant, the implant was removed, patient states that she had infection at the implant site. She has been going to [facility] for hyperbaric oxygen therapy. Patient developed fluid accumulation at site of prior infection (seroma) . The fluid was removed but has returned. Patient had chemical infusion/ sclerosis to induce scarring, patient states, but some of the fluid has returned.  Radiation has been completed. Port has been removed.  Patient has not lost weight, taste has improved again. Patient gets blood work  at [facility] she states, she is due for blood work here. Patient states that she has refused further plastic surgery for breast reconstruction. Patient receives Zoladex now, she has no menses. She does wonder of Zoladex is able to be taken with a weight loss med, she has no coverage for gastric bypass surgery, paternal aunt has recently been diagnosed with pancreatic cancer. Patient is the only other person in her family to have cancer history.        ROS otherwise negative aside from what was mentioned above in HPI.      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)    Prediabetes         Past Medical History:   Diagnosis Date    Acanthosis nigricans 07/05/2023    Anemia 07/05/2023    Breast lump in female 07/05/2023    Daytime hypersomnolence 07/05/2023    Endometriosis 07/05/2023    Essential hypertension 07/05/2023    Fatigue 07/05/2023    Fibrocystic breast 07/05/2023    Frequent urination 07/05/2023    Missed menses 07/05/2023    Morbid obesity (Multi) 07/05/2023    CONSTANZA on CPAP 07/05/2023    Snoring 07/05/2023     Past Surgical History:   Procedure Laterality Date    BI US GUIDED BREAST LOCALIZATION AND BIOPSY RIGHT Right 4/24/2023    BI US GUIDED BREAST LOCALIZATION AND BIOPSY RIGHT 4/24/2023    IR CVC PORT REMOVAL  5/24/2023    IR CVC PORT REMOVAL 5/24/2023    OTHER SURGICAL HISTORY  08/04/2022    Cholecystectomy    OTHER SURGICAL HISTORY  08/04/2022    Tooth extraction     Social History     Social History Narrative    Not on file         ALLERGIES  Lisinopril      MEDICATIONS  Current Outpatient Medications on File Prior to Visit   Medication Sig Dispense Refill    amLODIPine (Norvasc) 5 mg tablet Take 1 tablet (5 mg) by mouth once daily. 90 tablet 3    goserelin (Zoladex) 3.6 mg injection 1 each (3.6 mg) by abdominal subcutaneous route every 30 (thirty) days.      losartan (Cozaar) 100 mg tablet Take 1 tablet (100 mg) by mouth once daily. 90 tablet 3    [DISCONTINUED] chlorthalidone (Hygroton) 25 mg tablet Take 1 tablet (25 mg) by mouth once daily. 90 tablet 3     No current facility-administered medications on file prior to visit.         PHYSICAL EXAM  /80 (BP Location: Left arm, Patient Position: Sitting, BP Cuff Size: Large adult)   Pulse 65   Temp 36.4 °C (97.6 °F) (Temporal)   Resp 16    "Ht 1.702 m (5' 7\")   Wt 140 kg (308 lb 6.4 oz)   SpO2 97%   BMI 48.30 kg/m²   Body mass index is 48.3 kg/m².  Gen: Alert, NAD, patient is morbidly obese  HEENT:  wears glasses, EOMI, conjunctiva and sclera normal in appearance, no thyromegaly or neck masses, neck is large  Respiratory:  Lungs CTAB  Cardiovascular:  Heart RRR. No M/R/G, no peripheral edema, no carotid bruits noted  Chest: patient has a bandage covering wound of the right chest. She is post right mastectomy  Abdomen: marked obesity noted  Neuro:  Gross motor and sensory intact  Skin:  No suspicious lesions present on exposed skin    ASSESSMENT/PLAN  Problem List Items Addressed This Visit       Essential hypertension - Primary    Current Assessment & Plan     BP is controlled today, will not resume diuretic therapy , still encourage weight loss. 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 She continues to follow up for wound care, she takes Zoladex also          Relevant Orders    Referral to Clinical Pharmacy    Comprehensive Metabolic Panel    CBC and Auto Differential    Morbid obesity (Multi)    Relevant Medications    semaglutide (Ozempic) 0.25 mg or 0.5 mg(2 mg/1.5 mL) pen injector    Other Relevant Orders    Referral to Clinical Pharmacy    Hemoglobin A1C    Lipid Panel    Comprehensive Metabolic Panel    CBC and Auto Differential    CONSTANZA on CPAP    Current Assessment & Plan     Continue CPAP         Relevant Orders "    Comprehensive Metabolic Panel    CBC and Auto Differential    Prediabetes    Current Assessment & Plan     Patient is obese, she would benefit from GLP 1 agonist. She  denies history of thyroid malignancy, aunt developed pancreatic cancer. Trial of Ozempic low dose, will refer to clinical pharmacy for assessment also. This will lower glucose and should help reduce weight.          Relevant Medications    semaglutide (Ozempic) 0.25 mg or 0.5 mg(2 mg/1.5 mL) pen injector    Other Relevant Orders    Referral to Clinical Pharmacy    Albumin-Creatinine Ratio, Urine Random    Hemoglobin A1C    Lipid Panel    Comprehensive Metabolic Panel    CBC and Auto Differential     Other Visit Diagnoses       Postmenopausal bone loss        Relevant Orders    Vitamin D 25-Hydroxy,Total (for eval of Vitamin D levels)          Check labs, trial of Ozempic, will refer to clinical pharmacy for review also    Follow up in 3 months     Dewayne Mccarthy MD

## 2024-11-27 NOTE — ASSESSMENT & PLAN NOTE
Patient continues to follow up with plastic surgery at [facility], continue current follow up

## 2024-11-27 NOTE — ASSESSMENT & PLAN NOTE
Patient is obese, she would benefit from GLP 1 agonist. She  denies history of thyroid malignancy, aunt developed pancreatic cancer. Trial of Ozempic low dose, will refer to clinical pharmacy for assessment also. This will lower glucose and should help reduce weight.

## 2024-11-27 NOTE — ASSESSMENT & PLAN NOTE
BP is controlled today, will not resume diuretic therapy , still encourage weight loss. Continue current med therapies

## 2024-12-02 ENCOUNTER — TELEPHONE (OUTPATIENT)
Dept: PRIMARY CARE | Facility: CLINIC | Age: 44
End: 2024-12-02
Payer: COMMERCIAL

## 2024-12-02 NOTE — TELEPHONE ENCOUNTER
Prior authorization request received via fax for: OZEMPIC 0.25 OR 0.5 MG/DOSE     Form given to: PLACED IN LEAD MA'S BOX ON: 12/2/2024

## 2024-12-05 ENCOUNTER — TELEPHONE (OUTPATIENT)
Dept: PRIMARY CARE | Facility: CLINIC | Age: 44
End: 2024-12-05
Payer: COMMERCIAL

## 2024-12-06 NOTE — PROGRESS NOTES
Pharmacist Clinic: Weight Management    Kim Hawthorne was referred to the Clinical Pharmacy Team for weight management.     Referring Provider: Dewayne Mccarthy, *  - Last visit with referring provider: 11/27/24    HISTORY OF PRESENT ILLNESS    - Patient with baseline BMI = 48.30 referred to assist with initiation of GLP-1 agonist therapy for weight loss    Diet:   - 3-4 meals/day  - Loves sweets   - No coffee or caffeine  - Caffeine free pepsi     Exercise Routine:   - None    Weight loss:   - Baseline weight: 308 lbs    Adverse effects: N/A    PHARMACY ASSESSMENT    CURRENT WEIGHT LOSS PHARMACOTHERAPY  - None     AFFORDABILITY/ACCESSIBILITY  - Ozempic PA denied due to no hx of T2DM  - Zepbound/Wegovy not covered  - Household: 1   - Filed taxes  - < $61,000     PREFERRED PHARMACY:  - [facility]    DRUG INTERACTIONS  - No significant drug-drug interactions exist that require adjustment to therapy    Pertinent PMH Review:  - PMH of Pancreatitis: No  - PMH of Retinopathy: No  - PMH of MTC: No    Allergies: Lisinopril     [id number] - [place], [place] - [address] [address] [address]  Phone: [phone] Fax: [phone]    [id number] [place], [place] - [address]  [address] [address]  Phone: [phone] Fax: [phone]      LAB  Lab Results   Component Value Date    BILITOT 0.9 07/10/2024    CALCIUM 8.8 07/10/2024    CO2 29 07/10/2024     07/10/2024    CREATININE 0.69 07/10/2024    GLUCOSE 117 (H) 07/10/2024    ALKPHOS 118 (H) 07/10/2024    K 3.2 (L) 07/10/2024    PROT 7.0 07/10/2024     07/10/2024    AST 25 07/10/2024    ALT 31 07/10/2024    BUN 15 07/10/2024    ANIONGAP 10 07/10/2024    ALBUMIN 4.0 07/10/2024    EGFR >90 07/10/2024     Lab Results   Component Value Date    TRIG 157 (H) 07/10/2024    CHOL 165 07/10/2024    LDLCALC 96 07/10/2024    HDL 37.6 07/10/2024     Lab Results    Component Value Date    HGBA1C 6.0 (H) 07/10/2024       Current Outpatient Medications on File Prior to Visit   Medication Sig Dispense Refill    amLODIPine (Norvasc) 5 mg tablet Take 1 tablet (5 mg) by mouth once daily. 90 tablet 3    goserelin (Zoladex) 3.6 mg injection 1 each (3.6 mg) by abdominal subcutaneous route every 30 (thirty) days.      losartan (Cozaar) 100 mg tablet Take 1 tablet (100 mg) by mouth once daily. 90 tablet 3    semaglutide (Ozempic) 0.25 mg or 0.5 mg(2 mg/1.5 mL) pen injector Inject 0.25 mg under the skin 1 (one) time per week. 1.5 mL 1     No current facility-administered medications on file prior to visit.       PATIENT EDUCATION/GOALS  - Target goal 5% to 10% weight loss within 3-6 months  - Counseled patient on MOA, expectations, side effects, duration of therapy, contraindications, administration, and monitoring parameters  - Answered all patient questions and concerns; provided [facility] phone number if issues/questions arise    RECOMMENDATIONS/PLAN  Problem List Items Addressed This Visit       Morbid obesity (Multi)    Relevant Orders    Referral to Clinical Pharmacy    Malignant neoplasm of right breast in female, estrogen receptor positive    Prediabetes    Relevant Orders    Referral to Clinical Pharmacy       ASSESSMENT:  Patient with baseline BMI = 48.30 interested in GLP-1 agonist therapy for weight loss.    Patient referred for weight management with history of prediabetes. GLP PA denied due to no history of T2DM. Patient thinks she may qualify for PAP. Will wait for her to give me documents then will enroll if able. Will start her on Wegovy 0.25 mg weekly.    PLAN:  START Wegovy 0.25 mg weekly  Education provided:   Wegovy must be refrigerated. If necessary, the pen may be kept at room temperature for up to 28 days. Each box comes with 4 pens, one for each week.     1.  Remove the pen from the refrigerator. Keep the pen cap on until you are ready to inject.   2.  Check the pen  label to make sure that it is the correct medication and dose. Also check to make sure that the solution is not cloudy, discolored or have particles in it.   3. Prior to administration wash your hands.   4. Choose your injection site either your abdomen or thigh (if someone else is giving the injection the upper arm can also be used).   5. Ensure to change (rotate) injection sites each week. You can use the same area of the body but inject in a different part of that area.   6. Once the injection site has been selected use an alcohol swab to clean off the area.   7. Remove the grey cap and press firmly onto the injection site.   8. Push the pen against the skin until the yellow bar has stopped moving. You will hear two clicks; one indicates that the injection has started, and the other indicates an ongoing injection. The injection process will take about 10 seconds.  9. Do not rub the area after administration   10. Remove pen and discard in a sharps container (such as a milk jug, detergent bottle, or coffee canister)   11. Injections should be done on the same day each week, if you forget you have up to 5 days to take your dose.    -  Counseled patient on Wegovy MOA, expectations, side effects, duration of therapy, administration, and monitoring parameters.  - Advised patient that they may experience improved satiety after meals and portion sizes of meals may be reduced as doses of Wegovy increase.  - Counseled patient to avoid foods that are fatty/oily as this may precipitate the nausea/GI upset that may occur with new start Wegovy.   Clinical Pharmacist follow up: 1/6/25 @ 10   PCP follow up: 2/19/25    Continue all meds under the continuation of care with the referring provider and clinical pharmacy team.    Thank you,  Arcelia Bruce, PharmD  Clinical Pharmacy Specialist  [phone]  [email or web address]     Verbal consent to manage patient's drug therapy was obtained from patient. They were  informed they may decline to participate or withdraw from participation in pharmacy services at any time.

## 2024-12-09 ENCOUNTER — APPOINTMENT (OUTPATIENT)
Dept: PHARMACY | Facility: HOSPITAL | Age: 44
End: 2024-12-09
Payer: COMMERCIAL

## 2024-12-09 DIAGNOSIS — C50.911 MALIGNANT NEOPLASM OF RIGHT BREAST IN FEMALE, ESTROGEN RECEPTOR POSITIVE, UNSPECIFIED SITE OF BREAST: ICD-10-CM

## 2024-12-09 DIAGNOSIS — E66.01 MORBID OBESITY (MULTI): ICD-10-CM

## 2024-12-09 DIAGNOSIS — Z17.0 MALIGNANT NEOPLASM OF RIGHT BREAST IN FEMALE, ESTROGEN RECEPTOR POSITIVE, UNSPECIFIED SITE OF BREAST: ICD-10-CM

## 2024-12-09 DIAGNOSIS — R73.03 PREDIABETES: ICD-10-CM

## 2025-01-06 ENCOUNTER — APPOINTMENT (OUTPATIENT)
Dept: PHARMACY | Facility: HOSPITAL | Age: 45
End: 2025-01-06
Payer: COMMERCIAL

## 2025-01-31 ENCOUNTER — DOCUMENTATION (OUTPATIENT)
Dept: PHARMACY | Facility: HOSPITAL | Age: 45
End: 2025-01-31
Payer: COMMERCIAL

## 2025-01-31 NOTE — PROGRESS NOTES
Cancelled Clinical Pharmacy Referral:    Referral to clinical pharmacy specialist has been cancelled for Kim Hawthorne due to scheduling team unable to contact patient for follow up appointment. If patient wishes to be re-seen, PCP is to reach out to me for referral to be reinstated or a new referral must be placed.    Thank you,  Arcelia Bruce, PharmD  Clinical Pharmacy Specialist - Primary Care  [phone]  [email or web address]

## 2025-02-08 LAB
25(OH)D3+25(OH)D2 SERPL-MCNC: 9 NG/ML (ref 30–100)
ALBUMIN SERPL-MCNC: 4.3 G/DL (ref 3.6–5.1)
ALBUMIN/CREAT UR: NORMAL
ALP SERPL-CCNC: 154 U/L (ref 31–125)
ALT SERPL-CCNC: 41 U/L (ref 6–29)
ANION GAP SERPL CALCULATED.4IONS-SCNC: 9 MMOL/L (CALC) (ref 7–17)
AST SERPL-CCNC: 36 U/L (ref 10–30)
BASOPHILS # BLD AUTO: 43 CELLS/UL (ref 0–200)
BASOPHILS NFR BLD AUTO: 0.6 %
BILIRUB SERPL-MCNC: 0.7 MG/DL (ref 0.2–1.2)
BUN SERPL-MCNC: 11 MG/DL (ref 7–25)
CALCIUM SERPL-MCNC: 9.6 MG/DL (ref 8.6–10.2)
CHLORIDE SERPL-SCNC: 103 MMOL/L (ref 98–110)
CHOLEST SERPL-MCNC: 184 MG/DL
CHOLEST/HDLC SERPL: 4.5 (CALC)
CO2 SERPL-SCNC: 30 MMOL/L (ref 20–32)
CREAT SERPL-MCNC: 0.65 MG/DL (ref 0.5–0.99)
CREAT UR-MCNC: NORMAL MG/DL
EGFRCR SERPLBLD CKD-EPI 2021: 112 ML/MIN/1.73M2
EOSINOPHIL # BLD AUTO: 291 CELLS/UL (ref 15–500)
EOSINOPHIL NFR BLD AUTO: 4.1 %
ERYTHROCYTE [DISTWIDTH] IN BLOOD BY AUTOMATED COUNT: 15.8 % (ref 11–15)
EST. AVERAGE GLUCOSE BLD GHB EST-MCNC: 117 MG/DL
EST. AVERAGE GLUCOSE BLD GHB EST-SCNC: 6.5 MMOL/L
GLUCOSE SERPL-MCNC: 102 MG/DL (ref 65–99)
HBA1C MFR BLD: 5.7 % OF TOTAL HGB
HCT VFR BLD AUTO: 44.2 % (ref 35–45)
HDLC SERPL-MCNC: 41 MG/DL
HGB BLD-MCNC: 13.9 G/DL (ref 11.7–15.5)
LDLC SERPL CALC-MCNC: 115 MG/DL (CALC)
LYMPHOCYTES # BLD AUTO: 2130 CELLS/UL (ref 850–3900)
LYMPHOCYTES NFR BLD AUTO: 30 %
MCH RBC QN AUTO: 24.5 PG (ref 27–33)
MCHC RBC AUTO-ENTMCNC: 31.4 G/DL (ref 32–36)
MCV RBC AUTO: 77.8 FL (ref 80–100)
MICROALBUMIN UR-MCNC: NORMAL
MONOCYTES # BLD AUTO: 447 CELLS/UL (ref 200–950)
MONOCYTES NFR BLD AUTO: 6.3 %
NEUTROPHILS # BLD AUTO: 4189 CELLS/UL (ref 1500–7800)
NEUTROPHILS NFR BLD AUTO: 59 %
NONHDLC SERPL-MCNC: 143 MG/DL (CALC)
PLATELET # BLD AUTO: 385 THOUSAND/UL (ref 140–400)
PMV BLD REES-ECKER: 10.3 FL (ref 7.5–12.5)
POTASSIUM SERPL-SCNC: 3.8 MMOL/L (ref 3.5–5.3)
PROT SERPL-MCNC: 7.1 G/DL (ref 6.1–8.1)
RBC # BLD AUTO: 5.68 MILLION/UL (ref 3.8–5.1)
SODIUM SERPL-SCNC: 142 MMOL/L (ref 135–146)
TRIGL SERPL-MCNC: 165 MG/DL
WBC # BLD AUTO: 7.1 THOUSAND/UL (ref 3.8–10.8)

## 2025-02-10 LAB
25(OH)D3+25(OH)D2 SERPL-MCNC: 9 NG/ML (ref 30–100)
ALBUMIN SERPL-MCNC: 4.3 G/DL (ref 3.6–5.1)
ALBUMIN/CREAT UR: 65 MG/G CREAT
ALP SERPL-CCNC: 154 U/L (ref 31–125)
ALT SERPL-CCNC: 41 U/L (ref 6–29)
ANION GAP SERPL CALCULATED.4IONS-SCNC: 9 MMOL/L (CALC) (ref 7–17)
AST SERPL-CCNC: 36 U/L (ref 10–30)
BASOPHILS # BLD AUTO: 43 CELLS/UL (ref 0–200)
BASOPHILS NFR BLD AUTO: 0.6 %
BILIRUB SERPL-MCNC: 0.7 MG/DL (ref 0.2–1.2)
BUN SERPL-MCNC: 11 MG/DL (ref 7–25)
CALCIUM SERPL-MCNC: 9.6 MG/DL (ref 8.6–10.2)
CHLORIDE SERPL-SCNC: 103 MMOL/L (ref 98–110)
CHOLEST SERPL-MCNC: 184 MG/DL
CHOLEST/HDLC SERPL: 4.5 (CALC)
CO2 SERPL-SCNC: 30 MMOL/L (ref 20–32)
CREAT SERPL-MCNC: 0.65 MG/DL (ref 0.5–0.99)
CREAT UR-MCNC: 89 MG/DL (ref 20–275)
EGFRCR SERPLBLD CKD-EPI 2021: 112 ML/MIN/1.73M2
EOSINOPHIL # BLD AUTO: 291 CELLS/UL (ref 15–500)
EOSINOPHIL NFR BLD AUTO: 4.1 %
ERYTHROCYTE [DISTWIDTH] IN BLOOD BY AUTOMATED COUNT: 15.8 % (ref 11–15)
EST. AVERAGE GLUCOSE BLD GHB EST-MCNC: 117 MG/DL
EST. AVERAGE GLUCOSE BLD GHB EST-SCNC: 6.5 MMOL/L
GLUCOSE SERPL-MCNC: 102 MG/DL (ref 65–99)
HBA1C MFR BLD: 5.7 % OF TOTAL HGB
HCT VFR BLD AUTO: 44.2 % (ref 35–45)
HDLC SERPL-MCNC: 41 MG/DL
HGB BLD-MCNC: 13.9 G/DL (ref 11.7–15.5)
LDLC SERPL CALC-MCNC: 115 MG/DL (CALC)
LYMPHOCYTES # BLD AUTO: 2130 CELLS/UL (ref 850–3900)
LYMPHOCYTES NFR BLD AUTO: 30 %
MCH RBC QN AUTO: 24.5 PG (ref 27–33)
MCHC RBC AUTO-ENTMCNC: 31.4 G/DL (ref 32–36)
MCV RBC AUTO: 77.8 FL (ref 80–100)
MICROALBUMIN UR-MCNC: 5.8 MG/DL
MONOCYTES # BLD AUTO: 447 CELLS/UL (ref 200–950)
MONOCYTES NFR BLD AUTO: 6.3 %
NEUTROPHILS # BLD AUTO: 4189 CELLS/UL (ref 1500–7800)
NEUTROPHILS NFR BLD AUTO: 59 %
NONHDLC SERPL-MCNC: 143 MG/DL (CALC)
PLATELET # BLD AUTO: 385 THOUSAND/UL (ref 140–400)
PMV BLD REES-ECKER: 10.3 FL (ref 7.5–12.5)
POTASSIUM SERPL-SCNC: 3.8 MMOL/L (ref 3.5–5.3)
PROT SERPL-MCNC: 7.1 G/DL (ref 6.1–8.1)
RBC # BLD AUTO: 5.68 MILLION/UL (ref 3.8–5.1)
SODIUM SERPL-SCNC: 142 MMOL/L (ref 135–146)
TRIGL SERPL-MCNC: 165 MG/DL
WBC # BLD AUTO: 7.1 THOUSAND/UL (ref 3.8–10.8)

## 2025-02-19 ENCOUNTER — APPOINTMENT (OUTPATIENT)
Dept: PRIMARY CARE | Facility: CLINIC | Age: 45
End: 2025-02-19
Payer: COMMERCIAL

## 2025-02-19 VITALS
SYSTOLIC BLOOD PRESSURE: 122 MMHG | HEART RATE: 77 BPM | WEIGHT: 293 LBS | RESPIRATION RATE: 16 BRPM | HEIGHT: 67 IN | DIASTOLIC BLOOD PRESSURE: 79 MMHG | OXYGEN SATURATION: 97 % | TEMPERATURE: 98.5 F | BODY MASS INDEX: 45.99 KG/M2

## 2025-02-19 DIAGNOSIS — R74.8 ELEVATED LIVER ENZYMES: ICD-10-CM

## 2025-02-19 DIAGNOSIS — R73.03 PREDIABETES: ICD-10-CM

## 2025-02-19 DIAGNOSIS — E55.9 VITAMIN D DEFICIENCY: ICD-10-CM

## 2025-02-19 DIAGNOSIS — I10 ESSENTIAL HYPERTENSION: Primary | ICD-10-CM

## 2025-02-19 DIAGNOSIS — E66.01 MORBID OBESITY (MULTI): ICD-10-CM

## 2025-02-19 DIAGNOSIS — C80.1: ICD-10-CM

## 2025-02-19 DIAGNOSIS — C79.81: ICD-10-CM

## 2025-02-19 PROBLEM — E87.6 HYPOKALEMIA: Status: ACTIVE | Noted: 2025-02-19

## 2025-02-19 PROCEDURE — 3074F SYST BP LT 130 MM HG: CPT | Performed by: INTERNAL MEDICINE

## 2025-02-19 PROCEDURE — 3078F DIAST BP <80 MM HG: CPT | Performed by: INTERNAL MEDICINE

## 2025-02-19 PROCEDURE — 99214 OFFICE O/P EST MOD 30 MIN: CPT | Performed by: INTERNAL MEDICINE

## 2025-02-19 PROCEDURE — 1036F TOBACCO NON-USER: CPT | Performed by: INTERNAL MEDICINE

## 2025-02-19 PROCEDURE — 3008F BODY MASS INDEX DOCD: CPT | Performed by: INTERNAL MEDICINE

## 2025-02-19 RX ORDER — ACETAMINOPHEN 500 MG
4000 TABLET ORAL DAILY
Qty: 200 CAPSULE | Refills: 3
Start: 2025-02-19

## 2025-02-19 RX ORDER — ANASTROZOLE 1 MG/1
1 TABLET ORAL
COMMUNITY
Start: 2024-08-30 | End: 2025-02-26

## 2025-02-19 SDOH — ECONOMIC STABILITY: FOOD INSECURITY: WITHIN THE PAST 12 MONTHS, THE FOOD YOU BOUGHT JUST DIDN'T LAST AND YOU DIDN'T HAVE MONEY TO GET MORE.: NEVER TRUE

## 2025-02-19 SDOH — ECONOMIC STABILITY: FOOD INSECURITY: WITHIN THE PAST 12 MONTHS, YOU WORRIED THAT YOUR FOOD WOULD RUN OUT BEFORE YOU GOT MONEY TO BUY MORE.: NEVER TRUE

## 2025-02-19 ASSESSMENT — LIFESTYLE VARIABLES
HOW MANY STANDARD DRINKS CONTAINING ALCOHOL DO YOU HAVE ON A TYPICAL DAY: PATIENT DOES NOT DRINK
HOW OFTEN DO YOU HAVE A DRINK CONTAINING ALCOHOL: NEVER
HOW OFTEN DO YOU HAVE SIX OR MORE DRINKS ON ONE OCCASION: NEVER
AUDIT-C TOTAL SCORE: 0
SKIP TO QUESTIONS 9-10: 1

## 2025-02-19 NOTE — PROGRESS NOTES
Chief Complaint/HPI:    Hypertension: patient now takes amlodipine, losartan. BP control is improved today. Patient states that chlorthalidone prescription was not renewed, she does not appear to need the med,  BP has been well controlled        breast cancer: patient is being treated for breast cancer at [facility], she had chemotherapy, surgery was completed, breast implant was completed by plastic surgery, the patient had a reaction to breast implant, the implant was removed, patient states that she had infection at the implant site. She has been going to [facility] for hyperbaric oxygen therapy. Patient developed fluid accumulation at site of prior infection (seroma). The fluid was removed but has returned. Patient had chemical infusion/ sclerosis to induce scarring, patient states, but some of the fluid has returned.  Radiation has been completed. Port has been removed.  Patient has not lost weight, taste has improved again. Patient gets blood work at [facility] she states. Patient states that she has refused further plastic surgery for breast reconstruction. Patient receives Zoladex now, she has no menses. She does wonder of Zoladex is able to be taken with a weight loss med, she has no coverage for gastric bypass surgery, paternal aunt has recently been diagnosed with pancreatic cancer.  Gets mammograms on the left every 6 months for 5 years.    Obesity: Would like to hold off on GLP-1 agonists. Patient does not want to see nutritional support at this time    ROS otherwise negative aside from what was mentioned above in HPI.      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)    Prediabetes    Hypokalemia    Vitamin D deficiency    Elevated liver enzymes         Past Medical History:   Diagnosis Date    Acanthosis nigricans 07/05/2023    Anemia 07/05/2023    Breast lump in female 07/05/2023    Daytime hypersomnolence 07/05/2023    Endometriosis 07/05/2023    Essential hypertension 07/05/2023    Fatigue 07/05/2023    Fibrocystic breast 07/05/2023    Frequent urination 07/05/2023    Missed menses 07/05/2023    Morbid obesity (Multi) 07/05/2023    CONSTANZA on CPAP 07/05/2023    Snoring 07/05/2023     Past Surgical History:   Procedure Laterality Date    BI US GUIDED BREAST LOCALIZATION AND BIOPSY RIGHT Right 4/24/2023    BI US GUIDED BREAST LOCALIZATION AND BIOPSY RIGHT 4/24/2023    IR CVC PORT REMOVAL  5/24/2023    IR CVC PORT REMOVAL 5/24/2023    OTHER SURGICAL HISTORY  08/04/2022    Cholecystectomy    OTHER SURGICAL HISTORY  08/04/2022    Tooth extraction     Social History     Social History Narrative    Not on file         ALLERGIES  Lisinopril      MEDICATIONS  Current Outpatient Medications on File Prior to Visit   Medication Sig Dispense Refill    amLODIPine (Norvasc) 5 mg tablet Take 1 tablet (5 mg) by mouth once daily. 90 tablet 3    anastrozole (Arimidex) 1 mg tablet Take 1 tablet (1 mg total) by mouth once daily.      goserelin (Zoladex) 3.6 mg injection 1 each (3.6 mg) by abdominal subcutaneous route every 30 (thirty) days.      losartan (Cozaar) 100 mg tablet Take 1 tablet (100 mg) by mouth once daily. 90 tablet 3     No current facility-administered medications on file prior to visit.         PHYSICAL EXAM  /79 (BP Location: Left arm, Patient Position: Sitting, BP Cuff Size: Large adult)   Pulse 77   Temp 36.9 °C (98.5 °F) (Temporal)   Resp 16  "  Ht 1.702 m (5' 7\")   Wt 140 kg (308 lb 8 oz)   SpO2 97%   BMI 48.32 kg/m²   Body mass index is 48.32 kg/m².  Gen: Alert, NAD, patient is morbidly obese  HEENT:  wears glasses, EOMI, conjunctiva and sclera normal in appearance, no thyromegaly or neck masses, neck is large  Respiratory:  Lungs CTAB  Cardiovascular:  Heart RRR. No M/R/G, no peripheral edema, no carotid bruits noted  Chest: She is post right mastectomy  Abdomen: marked obesity noted  Neuro:  Gross motor and sensory intact  Skin:  No suspicious lesions present on exposed skin are noted    ASSESSMENT/PLAN  Problem List Items Addressed This Visit       Elevated liver enzymes    Current Assessment & Plan     Suspect fatty liver, will order liver US , patient may benefit from statin therapy in the future         Relevant Orders    CBC and Auto Differential    Comprehensive Metabolic Panel    Lipid Panel    US abdomen limited liver    Essential hypertension - Primary    Current Assessment & Plan     Continue current hypertensive regimen, no need to resume chlorthalidone at this time         Relevant Orders    Albumin-Creatinine Ratio, Urine Random    CBC and Auto Differential    Comprehensive Metabolic Panel    Malignant neoplasm metastatic to upper-outer quadrant of female breast with unknown primary site    Current Assessment & Plan     Patient had mastectomy , has had breast implant removed also. She follows up with oncology, she will not have further breast surgery at the present time         Relevant Orders    CBC and Auto Differential    Comprehensive Metabolic Panel    Morbid obesity (Multi)    Current Assessment & Plan     Patient will defer use of med therapy at this time, she has declined nutrition support also         Relevant Orders    CBC and Auto Differential    Comprehensive Metabolic Panel    TSH with reflex to Free T4 if abnormal    Prediabetes    Relevant Orders    Albumin-Creatinine Ratio, Urine Random    CBC and Auto Differential    " Comprehensive Metabolic Panel    Hemoglobin A1C    Vitamin D deficiency    Current Assessment & Plan     Start vitamin d 4000 units daily, continue to monitor         Relevant Medications    cholecalciferol (Vitamin D3) 50 mcg (2,000 unit) capsule    Other Relevant Orders    CBC and Auto Differential    Comprehensive Metabolic Panel    Vitamin D 25-Hydroxy,Total (for eval of Vitamin D levels)           Follow up in 6 months, recheck labs prior to visit, check an US of the liver , patient declines gyn exam at this time

## 2025-02-19 NOTE — ASSESSMENT & PLAN NOTE
Patient had mastectomy , has had breast implant removed also. She follows up with oncology, she will not have further breast surgery at the present time

## 2025-02-21 ENCOUNTER — HOSPITAL ENCOUNTER (OUTPATIENT)
Dept: RADIOLOGY | Facility: CLINIC | Age: 45
Discharge: HOME | End: 2025-02-21
Payer: COMMERCIAL

## 2025-02-21 DIAGNOSIS — R74.8 ELEVATED LIVER ENZYMES: ICD-10-CM

## 2025-02-21 PROCEDURE — 76705 ECHO EXAM OF ABDOMEN: CPT

## 2025-07-19 DIAGNOSIS — R74.8 ELEVATED LIVER ENZYMES: ICD-10-CM

## 2025-07-19 DIAGNOSIS — R73.03 PREDIABETES: ICD-10-CM

## 2025-07-19 DIAGNOSIS — E66.01 MORBID OBESITY (MULTI): ICD-10-CM

## 2025-07-19 DIAGNOSIS — C80.1: ICD-10-CM

## 2025-07-19 DIAGNOSIS — C79.81: ICD-10-CM

## 2025-07-19 DIAGNOSIS — I10 ESSENTIAL HYPERTENSION: ICD-10-CM

## 2025-07-19 DIAGNOSIS — E55.9 VITAMIN D DEFICIENCY: ICD-10-CM

## 2025-08-14 ENCOUNTER — OFFICE VISIT (OUTPATIENT)
Dept: WOUND CARE | Facility: CLINIC | Age: 45
End: 2025-08-14
Payer: COMMERCIAL

## 2025-08-14 LAB
25(OH)D3+25(OH)D2 SERPL-MCNC: 12 NG/ML (ref 30–100)
ALBUMIN SERPL-MCNC: 4.2 G/DL (ref 3.6–5.1)
ALP SERPL-CCNC: 155 U/L (ref 31–125)
ALT SERPL-CCNC: 26 U/L (ref 6–29)
ANION GAP SERPL CALCULATED.4IONS-SCNC: 9 MMOL/L (CALC) (ref 7–17)
AST SERPL-CCNC: 27 U/L (ref 10–30)
BASOPHILS # BLD AUTO: 39 CELLS/UL (ref 0–200)
BASOPHILS NFR BLD AUTO: 0.5 %
BILIRUB SERPL-MCNC: 0.8 MG/DL (ref 0.2–1.2)
BUN SERPL-MCNC: 13 MG/DL (ref 7–25)
CALCIUM SERPL-MCNC: 9.3 MG/DL (ref 8.6–10.2)
CHLORIDE SERPL-SCNC: 102 MMOL/L (ref 98–110)
CHOLEST SERPL-MCNC: 165 MG/DL
CHOLEST/HDLC SERPL: 4.3 (CALC)
CO2 SERPL-SCNC: 29 MMOL/L (ref 20–32)
CREAT SERPL-MCNC: 0.7 MG/DL (ref 0.5–0.99)
EGFRCR SERPLBLD CKD-EPI 2021: 109 ML/MIN/1.73M2
EOSINOPHIL # BLD AUTO: 281 CELLS/UL (ref 15–500)
EOSINOPHIL NFR BLD AUTO: 3.6 %
ERYTHROCYTE [DISTWIDTH] IN BLOOD BY AUTOMATED COUNT: 15 % (ref 11–15)
EST. AVERAGE GLUCOSE BLD GHB EST-MCNC: 120 MG/DL
EST. AVERAGE GLUCOSE BLD GHB EST-SCNC: 6.6 MMOL/L
GLUCOSE SERPL-MCNC: 102 MG/DL (ref 65–99)
HBA1C MFR BLD: 5.8 %
HCT VFR BLD AUTO: 42.9 % (ref 35–45)
HDLC SERPL-MCNC: 38 MG/DL
HGB BLD-MCNC: 13.9 G/DL (ref 11.7–15.5)
LDLC SERPL CALC-MCNC: 98 MG/DL (CALC)
LYMPHOCYTES # BLD AUTO: 2379 CELLS/UL (ref 850–3900)
LYMPHOCYTES NFR BLD AUTO: 30.5 %
MCH RBC QN AUTO: 25.5 PG (ref 27–33)
MCHC RBC AUTO-ENTMCNC: 32.4 G/DL (ref 32–36)
MCV RBC AUTO: 78.6 FL (ref 80–100)
MONOCYTES # BLD AUTO: 476 CELLS/UL (ref 200–950)
MONOCYTES NFR BLD AUTO: 6.1 %
NEUTROPHILS # BLD AUTO: 4625 CELLS/UL (ref 1500–7800)
NEUTROPHILS NFR BLD AUTO: 59.3 %
NONHDLC SERPL-MCNC: 127 MG/DL (CALC)
PLATELET # BLD AUTO: 335 THOUSAND/UL (ref 140–400)
PMV BLD REES-ECKER: 9.7 FL (ref 7.5–12.5)
POTASSIUM SERPL-SCNC: 3.7 MMOL/L (ref 3.5–5.3)
PROT SERPL-MCNC: 6.8 G/DL (ref 6.1–8.1)
RBC # BLD AUTO: 5.46 MILLION/UL (ref 3.8–5.1)
SODIUM SERPL-SCNC: 140 MMOL/L (ref 135–146)
TRIGL SERPL-MCNC: 195 MG/DL
TSH SERPL-ACNC: 2.57 MIU/L
WBC # BLD AUTO: 7.8 THOUSAND/UL (ref 3.8–10.8)

## 2025-08-14 PROCEDURE — 99213 OFFICE O/P EST LOW 20 MIN: CPT | Mod: 25

## 2025-08-14 PROCEDURE — 11042 DBRDMT SUBQ TIS 1ST 20SQCM/<: CPT

## 2025-08-19 ENCOUNTER — APPOINTMENT (OUTPATIENT)
Dept: PRIMARY CARE | Facility: CLINIC | Age: 45
End: 2025-08-19
Payer: COMMERCIAL

## 2025-08-19 VITALS
SYSTOLIC BLOOD PRESSURE: 132 MMHG | BODY MASS INDEX: 45.99 KG/M2 | RESPIRATION RATE: 18 BRPM | WEIGHT: 293 LBS | OXYGEN SATURATION: 96 % | HEIGHT: 67 IN | DIASTOLIC BLOOD PRESSURE: 84 MMHG | HEART RATE: 78 BPM | TEMPERATURE: 97.1 F

## 2025-08-19 DIAGNOSIS — G47.33 OSA ON CPAP: ICD-10-CM

## 2025-08-19 DIAGNOSIS — C50.911 MALIGNANT NEOPLASM OF RIGHT BREAST IN FEMALE, ESTROGEN RECEPTOR POSITIVE, UNSPECIFIED SITE OF BREAST: ICD-10-CM

## 2025-08-19 DIAGNOSIS — I10 ESSENTIAL HYPERTENSION: ICD-10-CM

## 2025-08-19 DIAGNOSIS — Z17.0 MALIGNANT NEOPLASM OF RIGHT BREAST IN FEMALE, ESTROGEN RECEPTOR POSITIVE, UNSPECIFIED SITE OF BREAST: ICD-10-CM

## 2025-08-19 DIAGNOSIS — Z12.11 COLON CANCER SCREENING: ICD-10-CM

## 2025-08-19 DIAGNOSIS — E55.9 VITAMIN D DEFICIENCY: Primary | ICD-10-CM

## 2025-08-19 DIAGNOSIS — E66.01 MORBID OBESITY (MULTI): ICD-10-CM

## 2025-08-19 DIAGNOSIS — R73.03 PREDIABETES: ICD-10-CM

## 2025-08-19 DIAGNOSIS — R74.8 ELEVATED LIVER ENZYMES: ICD-10-CM

## 2025-08-19 PROCEDURE — 3008F BODY MASS INDEX DOCD: CPT | Performed by: INTERNAL MEDICINE

## 2025-08-19 PROCEDURE — 3075F SYST BP GE 130 - 139MM HG: CPT | Performed by: INTERNAL MEDICINE

## 2025-08-19 PROCEDURE — 3079F DIAST BP 80-89 MM HG: CPT | Performed by: INTERNAL MEDICINE

## 2025-08-19 PROCEDURE — 99214 OFFICE O/P EST MOD 30 MIN: CPT | Performed by: INTERNAL MEDICINE

## 2025-08-19 PROCEDURE — 1036F TOBACCO NON-USER: CPT | Performed by: INTERNAL MEDICINE

## 2025-08-19 RX ORDER — ANASTROZOLE 1 MG/1
1 TABLET ORAL
COMMUNITY
Start: 2025-06-10

## 2025-08-19 RX ORDER — ERGOCALCIFEROL 1.25 MG/1
1.25 CAPSULE ORAL WEEKLY
Qty: 12 CAPSULE | Refills: 0 | Status: SHIPPED | OUTPATIENT
Start: 2025-08-19 | End: 2025-11-11

## 2025-08-19 RX ORDER — AMLODIPINE BESYLATE 5 MG/1
5 TABLET ORAL
Qty: 90 TABLET | Refills: 3 | Status: SHIPPED | OUTPATIENT
Start: 2025-08-19 | End: 2026-08-19

## 2025-08-19 RX ORDER — LOSARTAN POTASSIUM 100 MG/1
100 TABLET ORAL DAILY
Qty: 90 TABLET | Refills: 3 | Status: SHIPPED | OUTPATIENT
Start: 2025-08-19

## 2025-08-19 SDOH — ECONOMIC STABILITY: FOOD INSECURITY: WITHIN THE PAST 12 MONTHS, YOU WORRIED THAT YOUR FOOD WOULD RUN OUT BEFORE YOU GOT MONEY TO BUY MORE.: NEVER TRUE

## 2025-08-19 SDOH — ECONOMIC STABILITY: FOOD INSECURITY: WITHIN THE PAST 12 MONTHS, THE FOOD YOU BOUGHT JUST DIDN'T LAST AND YOU DIDN'T HAVE MONEY TO GET MORE.: NEVER TRUE

## 2025-08-19 ASSESSMENT — PAIN SCALES - GENERAL: PAINLEVEL_OUTOF10: 5

## 2025-08-20 ENCOUNTER — TELEPHONE (OUTPATIENT)
Facility: CLINIC | Age: 45
End: 2025-08-20
Payer: COMMERCIAL

## 2025-08-22 ENCOUNTER — OFFICE VISIT (OUTPATIENT)
Dept: WOUND CARE | Facility: CLINIC | Age: 45
End: 2025-08-22
Payer: COMMERCIAL

## 2025-08-22 PROCEDURE — 99212 OFFICE O/P EST SF 10 MIN: CPT

## 2025-08-29 ENCOUNTER — APPOINTMENT (OUTPATIENT)
Dept: WOUND CARE | Facility: CLINIC | Age: 45
End: 2025-08-29
Payer: COMMERCIAL

## 2025-09-18 ENCOUNTER — APPOINTMENT (OUTPATIENT)
Facility: CLINIC | Age: 45
End: 2025-09-18
Payer: COMMERCIAL

## 2026-01-20 ENCOUNTER — APPOINTMENT (OUTPATIENT)
Dept: PRIMARY CARE | Facility: CLINIC | Age: 46
End: 2026-01-20
Payer: COMMERCIAL